# Patient Record
Sex: FEMALE | Race: OTHER | Employment: UNEMPLOYED | URBAN - METROPOLITAN AREA
[De-identification: names, ages, dates, MRNs, and addresses within clinical notes are randomized per-mention and may not be internally consistent; named-entity substitution may affect disease eponyms.]

---

## 2018-12-17 PROBLEM — E66.01 SEVERE OBESITY (HCC): Status: ACTIVE | Noted: 2018-12-17

## 2019-01-08 ENCOUNTER — HOSPITAL ENCOUNTER (OUTPATIENT)
Dept: MAMMOGRAPHY | Age: 34
Discharge: HOME | End: 2019-01-08
Attending: FAMILY MEDICINE
Payer: COMMERCIAL

## 2019-01-08 DIAGNOSIS — N64.4 BREAST PAIN, LEFT: ICD-10-CM

## 2019-01-08 PROCEDURE — 77066 DX MAMMO INCL CAD BI: CPT

## 2019-01-08 NOTE — PROGRESS NOTES
Dear Ms. Diehl    Your recent diagnostic mammogram is normal.    IMPRESSION:  No mammographic evidence of malignancy. Advise to take OTC vitamin E 400 units daily to help with breast pain and avoid caffeine or any caffeine beverages.     Thanks,  Dr. Amrit Prince

## 2019-10-30 ENCOUNTER — HOSPITAL ENCOUNTER (OUTPATIENT)
Dept: ULTRASOUND IMAGING | Age: 34
Discharge: HOME | End: 2019-10-30
Attending: FAMILY MEDICINE
Payer: COMMERCIAL

## 2019-10-30 DIAGNOSIS — E03.8 OTHER SPECIFIED HYPOTHYROIDISM: ICD-10-CM

## 2019-10-30 DIAGNOSIS — M54.2 NECK PAIN: ICD-10-CM

## 2019-10-30 PROCEDURE — 76536 US EXAM OF HEAD AND NECK: CPT

## 2019-11-01 NOTE — PROGRESS NOTES
Dear Ms. Diehl    Your recent Thyroid ultrasound showed:   IMPRESSION:  Unremarkable thyroid gland. Bilateral submental lymphadenopathy. CT scan of the neck with contrast would better evaluate for additional adenopathy if clinically indicated. I am placing the order for the CT of the neck and ENT evaluation.     Thanks,  Dr. Chelsey Fields

## 2019-11-07 ENCOUNTER — HOSPITAL ENCOUNTER (OUTPATIENT)
Dept: CT IMAGING | Age: 34
Discharge: HOME | End: 2019-11-07
Attending: FAMILY MEDICINE
Payer: COMMERCIAL

## 2019-11-07 DIAGNOSIS — R59.0 LAD (LYMPHADENOPATHY), SUBMANDIBULAR: ICD-10-CM

## 2019-11-07 PROCEDURE — 74011000258 HC RX REV CODE- 258: Performed by: FAMILY MEDICINE

## 2019-11-07 PROCEDURE — 70491 CT SOFT TISSUE NECK W/DYE: CPT

## 2019-11-07 PROCEDURE — 74011636320 HC RX REV CODE- 636/320: Performed by: FAMILY MEDICINE

## 2019-11-07 RX ORDER — SODIUM CHLORIDE 0.9 % (FLUSH) 0.9 %
10 SYRINGE (ML) INJECTION
Status: COMPLETED | OUTPATIENT
Start: 2019-11-07 | End: 2019-11-07

## 2019-11-07 RX ADMIN — Medication 10 ML: at 17:04

## 2019-11-07 RX ADMIN — IOPAMIDOL 80 ML: 755 INJECTION, SOLUTION INTRAVENOUS at 17:04

## 2019-11-07 RX ADMIN — SODIUM CHLORIDE 100 ML: 900 INJECTION, SOLUTION INTRAVENOUS at 17:04

## 2019-11-08 NOTE — PROGRESS NOTES
Dear Ms. Diehl    Your recent CT shows that radiologically the lymph nodes have benign appearance. Per Radiology report:   IMPRESSION:  MILDLY ENLARGED BUT RELATIVELY SYMMETRIC AND HOMOGENEOUS BILATERAL JUGULODIGASTRIC NODES ARE LIKELY REACTIVE IN A 37YEAR-OLD WITH NO HISTORY OF MALIGNANCY AND NO CT EVIDENCE OF A PRIMARY TUMOR ELSEWHERE IN THE NECK. This case was reviewed in consultation with colleagues.     Thanks,  Dr. Tristan Soto

## 2020-02-29 ENCOUNTER — HOSPITAL ENCOUNTER (OUTPATIENT)
Dept: MAMMOGRAPHY | Age: 35
Discharge: HOME | End: 2020-02-29
Attending: FAMILY MEDICINE
Payer: COMMERCIAL

## 2020-02-29 DIAGNOSIS — Z12.31 VISIT FOR SCREENING MAMMOGRAM: ICD-10-CM

## 2020-02-29 PROCEDURE — 77067 SCR MAMMO BI INCL CAD: CPT

## 2020-03-03 NOTE — PROGRESS NOTES
Dear Mrs. Diehl     Your recent mammogram showed :No mammographic evidence of malignancy.      Recommend annual mammogram in one year.  A reminder letter will be scheduled.     Thanks,  Dr. Heather Elise